# Patient Record
Sex: MALE | Race: WHITE | URBAN - METROPOLITAN AREA
[De-identification: names, ages, dates, MRNs, and addresses within clinical notes are randomized per-mention and may not be internally consistent; named-entity substitution may affect disease eponyms.]

---

## 2018-09-07 ENCOUNTER — OP HISTORICAL/CONVERTED ENCOUNTER (OUTPATIENT)
Dept: OTHER | Age: 58
End: 2018-09-07

## 2020-12-14 ENCOUNTER — TELEPHONE (OUTPATIENT)
Dept: UROLOGY | Age: 60
End: 2020-12-14

## 2020-12-17 NOTE — TELEPHONE ENCOUNTER
He has not been seen by a provider since 05/22/2019 dr. Sandor Puentes will not refill something for a patient that is not his. And refills can not be given out unless a pt is seen once a year. He needs to make a appointment.     His appointment would be a f/u for BPH/ former dee pt

## 2021-01-04 ENCOUNTER — OFFICE VISIT (OUTPATIENT)
Dept: NEUROLOGY | Age: 61
End: 2021-01-04
Payer: COMMERCIAL

## 2021-01-04 VITALS
HEIGHT: 70 IN | TEMPERATURE: 97.8 F | OXYGEN SATURATION: 99 % | WEIGHT: 179.9 LBS | RESPIRATION RATE: 16 BRPM | BODY MASS INDEX: 25.75 KG/M2 | SYSTOLIC BLOOD PRESSURE: 170 MMHG | DIASTOLIC BLOOD PRESSURE: 58 MMHG

## 2021-01-04 DIAGNOSIS — R42 ORTHOSTATIC DIZZINESS: Primary | ICD-10-CM

## 2021-01-04 PROCEDURE — 99204 OFFICE O/P NEW MOD 45 MIN: CPT | Performed by: PSYCHIATRY & NEUROLOGY

## 2021-01-04 RX ORDER — TAMSULOSIN HYDROCHLORIDE 0.4 MG/1
0.4 CAPSULE ORAL DAILY
COMMUNITY

## 2021-01-04 RX ORDER — ATORVASTATIN CALCIUM 10 MG/1
10 TABLET, FILM COATED ORAL DAILY
COMMUNITY

## 2021-01-04 RX ORDER — LEVOTHYROXINE SODIUM 50 UG/1
50 TABLET ORAL
COMMUNITY

## 2021-01-04 NOTE — PROGRESS NOTES
Chief Complaint   Patient presents with    New Patient     C/o dizziness when he goes from sitting or laying down to standing up. Usually when it is a rapid movement.     Dizziness     Visit Vitals  BP (!) 170/58 (BP 1 Location: Left arm, BP Patient Position: Sitting)   Temp 97.8 °F (36.6 °C) (Temporal)   Resp 16   Ht 5' 10\" (1.778 m)   Wt 81.6 kg (179 lb 14.4 oz)   SpO2 99%   BMI 25.81 kg/m²

## 2021-01-04 NOTE — PROGRESS NOTES
NEUROLOGY NEW PATIENT OFFICE CONSULTATION      1/4/2021    RE: Chantal Flores         1960      REFERRED BY:  UNKNOWN        CHIEF COMPLAINT:  This is Chantal Flores is a 62 y.o. male  who had concerns including New Patient (C/o dizziness when he goes from sitting or laying down to standing up. Usually when it is a rapid movement.) and Dizziness. HPI:     For the past 2-3 months, patient will have episodes of breif lightheadedness  Described as being unsteady lasting for 10 secs when he is in a stooped posture and standing up too quickly. (-) nausea (-) vomiting (-) hearing loss (-) tinnitus    Patient was placed on Flomax 2 yrs ago. 2 yrs ago had cardiac work up which was okay as per patient. (-) chest pain  (-) palpitations  (+) recent stress with separation    ROS   (-) fever  (-) rash  All other systems reviewed and are negative    Past Medical Hx  Past Medical History:   Diagnosis Date    Androgen insensitivity syndrome     Hypercholesterolemia     Hypothyroid        Social Hx  Social History     Socioeconomic History    Marital status:      Spouse name: Not on file    Number of children: Not on file    Years of education: Not on file    Highest education level: Not on file   Tobacco Use    Smoking status: Never Smoker    Smokeless tobacco: Never Used       Family Hx  Family History   Problem Relation Age of Onset    Dementia Mother     Parkinson's Disease Father        ALLERGIES  No Known Allergies    CURRENT MEDS          PREVIOUS WORKUP: (reviewed)  IMAGING:    CT Results (recent):  No results found for this or any previous visit. MRI Results (recent):  No results found for this or any previous visit. IR Results (recent):  No results found for this or any previous visit. VAS/US Results (recent):  No results found for this or any previous visit. LABS (reviewed)  No results found for this or any previous visit.     Physical Exam:     Visit Vitals  BP (!) 170/58 (BP 1 Location: Left arm, BP Patient Position: Sitting)   Temp 97.8 °F (36.6 °C) (Temporal)   Resp 16   Ht 5' 10\" (1.778 m)   Wt 81.6 kg (179 lb 14.4 oz)   SpO2 99%   BMI 25.81 kg/m²     General:  Alert, cooperative, no distress. Head:  Normocephalic, without obvious abnormality, atraumatic. Eyes:  Conjunctivae/corneas clear. Lungs:  Heart:   Non labored breathing  Regular rate and rhythm, no carotid bruits   Abdomen:   Soft, non-distended   Extremities: Extremities normal, atraumatic, no cyanosis or edema. Pulses: 2+ and symmetric all extremities. Skin: Skin color, texture, turgor normal. No rashes or lesions. Neurologic Exam     Gen: Attention normal             Language: naming, repetition, fluency normal             Memory: intact recent and remote memory  Cranial Nerves:  I: smell Not tested   II: visual fields Full to confrontation   II: pupils Equal, round, reactive to light   II: optic disc No papilledema   III,VII: ptosis none   III,IV,VI: extraocular muscles  Full ROM   V: mastication normal   V: facial light touch sensation  normal   VII: facial muscle function   symmetric   VIII: hearing symmetric   IX: soft palate elevation  normal   XI: trapezius strength  5/5   XI: sternocleidomastoid strength 5/5   XI: neck flexion strength  5/5   XII: tongue  midline     Motor: normal bulk and tone, no tremor              Strength: 5/5 all four extremities  Sensory: intact to LT, PP, vibration, and JPS  Reflexes: 2+ throughout; Down going toes  Coordination: Good FTN and HTS  Gait: normal gait including tandem            Impression:     Smita Davis is a 62 y.o. male who  has a past medical history of Androgen insensitivity syndrome, Hypercholesterolemia, and Hypothyroid. who for the past 2-3 months, will have episodes of breif lightheadedness  Described as being unsteady lasting for 10 secs when he is in a stooped posture and standing up too quickly (I.e. doing burpees).  (-) nausea (-) vomiting (-) hearing loss (-) tinnitus. Considerations include orthostatic intolerance which may have been aggravated by medication side effect (I.e. Flomax)      RECOMMENDATIONS  1) Discussed with patient the pathophysiology of syncope and the need to avoid circumstances that can trigger symptoms (quickly standing from sitting position, dehydration)  2) Recommend talking to his urologist if he can stop Flomax and see if this improves symptoms  3) Advise to keep himself well hydrated, liberalize salt intake and trial of waist high elastic support stockings/leggings (I.e [employer])  4) Discussed ANS testing. Patient declined for now  5) Suggest to get cardiac evaluation if symptoms persists despite above        Follow-up and Dispositions    · Return if symptoms worsen or fail to improve.             Thank you for the consultation      Elaine Alvarez MD  Diplomate, American Board of Psychiatry and Neurology  Diplomate, Neuromuscular Medicine  Diplomate, American Board of Electrodiagnostic Medicine        CC: UNKNOWN  Fax: None

## 2023-05-19 RX ORDER — LEVOTHYROXINE SODIUM 0.05 MG/1
50 TABLET ORAL
COMMUNITY

## 2023-05-19 RX ORDER — ATORVASTATIN CALCIUM 10 MG/1
10 TABLET, FILM COATED ORAL DAILY
COMMUNITY

## 2023-05-19 RX ORDER — TAMSULOSIN HYDROCHLORIDE 0.4 MG/1
0.4 CAPSULE ORAL DAILY
COMMUNITY